# Patient Record
Sex: MALE | Race: WHITE | NOT HISPANIC OR LATINO | Employment: OTHER | URBAN - METROPOLITAN AREA
[De-identification: names, ages, dates, MRNs, and addresses within clinical notes are randomized per-mention and may not be internally consistent; named-entity substitution may affect disease eponyms.]

---

## 2017-05-18 ENCOUNTER — HISTORICAL (OUTPATIENT)
Dept: ADMINISTRATIVE | Facility: HOSPITAL | Age: 57
End: 2017-05-18

## 2017-05-18 LAB
ALBUMIN SERPL-MCNC: 4.3 GM/DL (ref 3.4–5)
ALBUMIN/GLOB SERPL: 2 RATIO (ref 1–2)
ALP SERPL-CCNC: 85 UNIT/L (ref 20–120)
ALT SERPL-CCNC: 49 UNIT/L
AST SERPL-CCNC: 32 UNIT/L
BILIRUB SERPL-MCNC: 1.1 MG/DL
BILIRUBIN DIRECT+TOT PNL SERPL-MCNC: <0.1 MG/DL
BILIRUBIN DIRECT+TOT PNL SERPL-MCNC: >1 MG/DL
BUN SERPL-MCNC: 20 MG/DL (ref 7–25)
CALCIUM SERPL-MCNC: 9.2 MG/DL (ref 8.4–10.3)
CHLORIDE SERPL-SCNC: 105 MMOL/L (ref 96–110)
CO2 SERPL-SCNC: 30 MMOL/L (ref 24–32)
CREAT SERPL-MCNC: 0.77 MG/DL (ref 0.7–1.4)
ERYTHROCYTE [DISTWIDTH] IN BLOOD BY AUTOMATED COUNT: 12.4 % (ref 11.5–14.5)
GLOBULIN SER-MCNC: 2.5 GM/ML (ref 2.3–3.5)
GLUCOSE SERPL-MCNC: 93 MG/DL (ref 65–99)
HCT VFR BLD AUTO: 43.3 % (ref 40–51)
HGB BLD-MCNC: 14.4 GM/DL (ref 13.5–17.5)
MCH RBC QN AUTO: 30.6 PG (ref 26–34)
MCHC RBC AUTO-ENTMCNC: 33.3 GM/DL (ref 31–37)
MCV RBC AUTO: 91.9 FL (ref 80–100)
PLATELET # BLD AUTO: 235 X10(3)/MCL (ref 130–400)
PMV BLD AUTO: 10.6 FL (ref 7.4–10.4)
POTASSIUM SERPL-SCNC: 3.9 MMOL/L (ref 3.6–5.2)
PROT SERPL-MCNC: 6.8 GM/DL (ref 6–8)
RBC # BLD AUTO: 4.71 X10(6)/MCL (ref 4.5–5.9)
SODIUM SERPL-SCNC: 142 MMOL/L (ref 135–146)
WBC # SPEC AUTO: 4.1 X10(3)/MCL (ref 4.5–11)

## 2017-05-25 ENCOUNTER — HISTORICAL (OUTPATIENT)
Dept: SURGERY | Facility: HOSPITAL | Age: 57
End: 2017-05-25

## 2017-06-20 ENCOUNTER — HISTORICAL (OUTPATIENT)
Dept: ADMINISTRATIVE | Facility: HOSPITAL | Age: 57
End: 2017-06-20

## 2017-09-19 ENCOUNTER — HISTORICAL (OUTPATIENT)
Dept: ADMINISTRATIVE | Facility: HOSPITAL | Age: 57
End: 2017-09-19

## 2017-09-21 LAB — FINAL CULTURE: NO GROWTH

## 2021-05-17 ENCOUNTER — HISTORICAL (OUTPATIENT)
Dept: RADIOLOGY | Facility: HOSPITAL | Age: 61
End: 2021-05-17

## 2021-05-19 LAB
ABS NEUT (OLG): 2.8 X10(3)/MCL (ref 1.5–6.9)
APTT PPP: 27.1 SEC (ref 23.4–34.9)
BUN SERPL-MCNC: 19 MG/DL (ref 8.4–25.7)
CALCIUM SERPL-MCNC: 9.8 MG/DL (ref 8.8–10)
CHLORIDE SERPL-SCNC: 105 MMOL/L (ref 98–107)
CO2 SERPL-SCNC: 31 MMOL/L (ref 23–31)
CREAT SERPL-MCNC: 0.86 MG/DL (ref 0.73–1.18)
CREAT/UREA NIT SERPL: 22
ERYTHROCYTE [DISTWIDTH] IN BLOOD BY AUTOMATED COUNT: 12.4 % (ref 11.5–17)
GLUCOSE SERPL-MCNC: 97 MG/DL (ref 82–115)
HCT VFR BLD AUTO: 44.1 % (ref 42–52)
HGB BLD-MCNC: 14.4 GM/DL (ref 14–18)
INR PPP: 1 (ref 2–3)
MCH RBC QN AUTO: 30 PG (ref 27–34)
MCHC RBC AUTO-ENTMCNC: 33 GM/DL (ref 31–36)
MCV RBC AUTO: 93 FL (ref 80–99)
PLATELET # BLD AUTO: 251 X10(3)/MCL (ref 140–400)
PMV BLD AUTO: 10.3 FL (ref 6.8–10)
POTASSIUM SERPL-SCNC: 4.4 MMOL/L (ref 3.5–5.1)
PROTHROMBIN TIME: 13.5 SEC (ref 11.7–14.5)
RBC # BLD AUTO: 4.74 X10(6)/MCL (ref 4.7–6.1)
SODIUM SERPL-SCNC: 142 MMOL/L (ref 136–145)
WBC # SPEC AUTO: 4.4 X10(3)/MCL (ref 4.5–11.5)

## 2021-05-20 ENCOUNTER — HISTORICAL (OUTPATIENT)
Dept: ANESTHESIOLOGY | Facility: HOSPITAL | Age: 61
End: 2021-05-20

## 2021-08-16 ENCOUNTER — HISTORICAL (OUTPATIENT)
Dept: LAB | Facility: HOSPITAL | Age: 61
End: 2021-08-16

## 2021-08-16 LAB — PSA SERPL-MCNC: 1.7 NG/ML

## 2022-04-10 ENCOUNTER — HISTORICAL (OUTPATIENT)
Dept: ADMINISTRATIVE | Facility: HOSPITAL | Age: 62
End: 2022-04-10

## 2022-04-29 VITALS
BODY MASS INDEX: 26.55 KG/M2 | OXYGEN SATURATION: 97 % | SYSTOLIC BLOOD PRESSURE: 108 MMHG | HEIGHT: 65 IN | WEIGHT: 167.31 LBS | DIASTOLIC BLOOD PRESSURE: 70 MMHG | HEIGHT: 67 IN | HEIGHT: 67 IN | DIASTOLIC BLOOD PRESSURE: 75 MMHG | SYSTOLIC BLOOD PRESSURE: 111 MMHG | SYSTOLIC BLOOD PRESSURE: 116 MMHG | OXYGEN SATURATION: 97 % | WEIGHT: 169.19 LBS | WEIGHT: 168.13 LBS | BODY MASS INDEX: 27.88 KG/M2 | BODY MASS INDEX: 26.39 KG/M2 | OXYGEN SATURATION: 97 % | DIASTOLIC BLOOD PRESSURE: 72 MMHG
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
## 2022-04-30 NOTE — OP NOTE
PREOPERATIVE DIAGNOSIS:  Left ureteral calculus.    POSTOPERATIVE DIAGNOSIS:  Left ureteral calculus.    ANESTHESIA:  General.    PROCEDURE:  Left ureteroscopy.    DESCRIPTION OF PROCEDURE:  After operative consent, patient was brought to the operating room and placed on the table in supine position.  General anesthesia induced.  Patient converted to dorsal lithotomy position.  Genital area prepped and draped in usual sterile fashion.  The previously placed stent was removed with the aid of its suture.  The scope was attempted to be inserted in the bladder, however, he had a fossa navicularis stricture and was unable to pass the scope.  This area of the urethra was dilated to a 4-Greek using the Giselle sounds.  The scope was then introduced.  The remainder of the urethra was within normal limits.  The prostate was 3 fingerbreadths with trilobar obstruction.  Bladder was smooth.  He had some bleeding from removal of the stent with ureteral orifices widely patent.  No other lesions identified.  At this time a left retrograde pyelogram was performed which revealed some hold-up of contrast in distal ureter.  At this time, a guidewire was guided up into the renal pelvis.  A rigid ureteroscope was advanced per urethra in the bladder and up the ureter and was advanced all the way into the renal pelvis.  No stones were evident.  There was no evidence of any injury to the ureter.  He did have an area of inflammation at the site of the stone impaction previously.  The ureteroscope was then removed.  Cystoscope was inserted.  We looked around the bladder trying to find a stone, however, visualization was compromised secondary to the bleeding.  We did not see a stone.  Retrograde pyelogram then performed.  He still continued to have this hold-up.  Ureteral scope was reinserted and advanced all the way into the renal pelvis.  Again, no stones were visualized and again no evidence of injury to the ureter.   Cystoscope reinserted.  Retrograde pyelogram performed.  No extravasation.  It was decided not to place a stent.  The bladder was then drained.  Cystoscope was removed.  The patient was returned to his room in good condition.  He will be     discharged on some Norco 5/325 1 p.o. q.6 hours p.r.n. pain and Cipro 500 mg for 3 days.  He will follow up in the office in 1 week with a KUB.        LM/AMERICO   DD: 05/20/2021 0736   DT: 05/20/2021 0831  Job # [id number]

## 2022-04-30 NOTE — DISCHARGE SUMMARY
Patient:   Ruben Thompson             MRN: [id number]            FIN: [id number]               Age:   56 years     Sex:  Male     :  1960   Associated Diagnoses:   None   Author:   Obey SHAW, Sima Castillo        DISCHARGE SUMMARY    Date Admitted: 2017    Date Discharged: Same    Chief Complaint: droopy eyelids causing visual obstruction OU    Significant findings:   Physical Examination: dermatochalasis   Laboratory: None   X-ray: None   Other Diagnostic Procedures: None    Principal Diagnosis: Dermatochalasis    Other Significant Diagnosis: None    Operations/Procedures: Bilateral upper lid blepharoplasty     Complications: None    Plan:   Follow-up: Next week at [facility] ophthalmology clinic   Medications: Resume home medications; use Tobradex ointment BID to incision sites    Diet: Resume home diet   Level of Activity: No strenous activity    Discharge to: Home    Condition at Discharge: Stable

## 2022-04-30 NOTE — H&P
Patient:   Ruben Thompson             MRN: [id number]            FIN: [id number]               Age:   56 years     Sex:  Male     :  1960   Associated Diagnoses:   None   Author:   Obey SHAW, Sima Castillo      Review of Systems   Constitutional:  Negative.    Eye:  Negative.    Ear/Nose/Mouth/Throat:  Negative.    Respiratory:  Negative.    Cardiovascular:  Negative.    Gastrointestinal:  Negative.    Genitourinary:  Negative.    Hematology/Lymphatics:  Negative.    Endocrine:  Negative.    Immunologic:  Negative.    Musculoskeletal:  Negative.    Integumentary:  Negative.    Neurologic:  Negative.    Psychiatric:  Negative.       Health Status   Allergies:    Allergic Reactions (All)  Severity Not Documented  No Known Allergies- No reactions were documented.,    Allergies (1) Active Reaction  No Known Allergies None Documented     Current medications:    Home Medications (6) Active  AMOX-CLAV 875MG TABLETS 1 tab(s), Oral, BID  Artificial Tears preserved ophthalmic solution 1 drop(s), PRN, Eye-Both, BID  Flonase 50 mcg/inh nasal spray 1 spray(s), Nasal, BID  Melatonin 5 mg oral capsule 5 mg = 1 cap(s), Oral, Once a day (at bedtime)  Norco 7.5 mg-325 mg oral tablet 1 tab(s), PRN, Oral, q4hr  Vitamin D , Oral     Problem list:    All Problems  Depression(  Confirmed  ) / SNOMED CT 65244632 / Confirmed  Deviated nasal septum(  Confirmed  ) / SNOMED CT 281675139 / Confirmed  Bilateral ankle fractures(  Confirmed  ) / SNOMED CT 26901645 / Confirmed  Facial fracture(  Confirmed  ) / SNOMED CT 73089826 / Confirmed  Bilateral fibular fractures / SNOMED CT 921072436 / Confirmed  Bilateral tibial fractures / SNOMED CT 17694662 / Confirmed  Nasal bone fracture(  Confirmed  ) / SNOMED CT 591073064 / Confirmed  Insomnia / SNOMED CT 349728256 / Confirmed  Palpitation(  Confirmed  ) / SNOMED CT 751784818 / Confirmed  Colon cancer screening / SNOMED CT 218005136 / Confirmed  Sleep apnea(  Confirmed  )  / SNOMED CT 765745973 / Confirmed  Vitamin D deficiency(  Confirmed  ) / SNOMED CT 60510136 / Confirmed,    Active Problems (12)  Bilateral ankle fractures(  Confirmed  )   Bilateral fibular fractures   Bilateral tibial fractures   Colon cancer screening   Depression(  Confirmed  )   Deviated nasal septum(  Confirmed  )   Facial fracture(  Confirmed  )   Insomnia   Nasal bone fracture(  Confirmed  )   Palpitation(  Confirmed  )   Sleep apnea(  Confirmed  )   Vitamin D deficiency(  Confirmed  )         Histories   Procedure history:    Rhinoplasty (None) on 10/21/2016 at 55 Years.  Comments:  10/21/2016 12:57 - Domenica Quintero RN  auto-populated from documented surgical case  Septoplasty on 10/21/2016 at 55 Years.  Comments:  10/21/2016 12:Shena - Domenica Quintero RN  auto-populated from documented surgical case  Turbinoplasty on 10/21/2016 at 55 Years.  Comments:  10/21/2016 12:Shena - Domenica Quintero RN  auto-populated from documented surgical case  Leg ([id number]).  Comments:  2/12/2015 16:37 - Contributor_system, [id number]  09/02/2014 16:19:04 - Green Qasimle: both legs  Left eye ([id number]).  Nose (457658675).  Injury, mouth ([id number]).  Kidney stones ([id number]).   Social History        Social & Psychosocial Habits    Alcohol  07/31/2015  Use: Never    Employment/School  07/31/2015  Status: DISABLE    Substance Abuse  07/31/2015  Use: Never    Tobacco  07/31/2015  Use: Never smoker    10/27/2016  Use: Never smoker  .        Physical Examination   General:  Alert and oriented.    Eye:  Pupils are equal, round and reactive to light.    Neck:  Supple.    Respiratory:  Lungs are clear to auscultation.    Cardiovascular:  Normal rate, Regular rhythm.    Gastrointestinal:  Soft, Non-tender.    Neurologic:  Alert, Oriented.       Impression and Plan   Counseled:  Patient.

## 2022-04-30 NOTE — PROGRESS NOTES
Patient:   Ruben Thompson             MRN: [id number]            FIN: [id number]               Age:   56 years     Sex:  Male     :  1960   Associated Diagnoses:   Penile pain; Hematospermia   Author:   Tremayne Weiss MD      Chief Complaint   2017 10:25 CDT      patient is c/o blood in his semen with slight pain, x5 days  (Modified)       History of Present Illness   56yoM who complains of blood visualized in his semen. The patient states a week prior to presentation during intercourse his ejaculation became painful and he noticed blood in his semen. Since that time the pain has subsided however he has still noticed blood in his semen on at least 2 occasions. He denies a hx of hematuria or dysuria. The patient denies priapism or hx of genitourinary structural abnormalities. He denies hx of STDs or steroid  use.      Review of Systems   Constitutional:  No fever.    Respiratory:  No shortness of breath, No cough.    Cardiovascular:  No chest pain.    Genitourinary:  No dysuria, No hematuria, No change in urine stream, No lesions.    All other systems are negative      Physical Examination   Vital Signs   2017 10:25 CDT      Temperature Oral          37.0 DegC                             Peripheral Pulse Rate     78 bpm                             SpO2                      97 %                             Systolic Blood Pressure   116 mmHg                             Diastolic Blood Pressure  75 mmHg     General:  Alert and oriented, No acute distress.    Eye:  Extraocular movements are intact.    Respiratory:  Lungs are clear to auscultation, Respirations are non-labored, Breath sounds are equal, Symmetrical chest wall expansion.    Cardiovascular:  Normal rate, Regular rhythm, No murmur.    Genitourinary:  No scrotal tenderness, No inguinal tenderness, No urethral discharge, No lesions, Normal cremasteric reflexes.         Groin/ inguinal region: femoral pulses intact bilaterally, No  inguinal hernia, No femoral hernia, No tenderness.         Penis: Foreskin ( Circumcised ), Shaft ( Not indurated, Not with priapism, Not tender ), No discharge.         Scrotum: mild transillumination on R scrotum.         Testes: Bilateral, mildly asymmetric, No mass.    Cognition and Speech:  Speech clear and coherent.    Psychiatric:  Cooperative, Appropriate mood & affect.       Impression and Plan   Diagnosis     Hematospermia (KKP20-TW R36.1).     Penile pain (XRM99-VW N48.89).     - GC/CT ordered  - HIV 1 and 2 ordered  - PSA ordered   - UA and Cx ordered  - Transrectal US Ordered  - Likely transient     RTC  with PCP Dr. Polanco in 2 weeks

## 2022-04-30 NOTE — OP NOTE
Patient:   Ruben Thompson             MRN: [id number]            FIN: [id number]               Age:   56 years     Sex:  Male     :  1960   Associated Diagnoses:   None   Author:   Sima Barnhart MD      PROCEDURE: BILATERAL UPPER LID BLEPHAROPLASTY  SURGEON:  JUDITH CARTAGENA III  ASSISTANT: ANATOLIY BARNHART  PRE OPERATIVE DIAGNOSIS: DERMATOCHALASIS  POST OPERATIVE DIAGNOSIS: DERMATOCHALASIS  DATE OF SURGERY: 2017  PROCEDURES: BILATERAL UPPER LID BLEPHAROPLASTY  ANESTHESIA: MAC with SQ 2% Lidocaine with epi  COMPLICATIONS: None   ESTIMATED BLOOD LOSS: 2mL  INDICATIONS: dermatochalasis  The patient has a history of painless progressive visual loss and difficulty with activities of daily living secondary to dermatochlasis. After a thorough discussion of the risks, benefits and alternatives to surgery, including, but not limited to, the rare risks of infection, need for additional surgery, loss of vision and even loss of the eye, the patient voices good understanding and desires to proceed.   DESCRIPTION OF PROCEDURE:   After verification and marking of eyelids in the preop holding area, the patient was brought to the operating room in supine position where the eyes were prepped and draped in standard sterile fashion using 5% betadine. SQ 2% Lidocaine with epi was injected into both upper lids. A 15-blade was used to cut the skin along the prviously made markings in an ellipsoid pattern. Wescots were then used to remove the skin and superficial tissues. The septum was then dissected and parts of the medial and middle orbital fat pats were removed. Hemostasis was achieved with bipolar cautery. The incison was then closed with 2 running 6-0 Nylon sutures. The same procedure was then performed on the other eye. The patient tolerated the procedure well without complicatons. He will use Tobradex ointment to the incisions BID and see us in clinic next week for suture removal.

## 2024-06-20 DIAGNOSIS — R10.9 UNSPECIFIED ABDOMINAL PAIN: ICD-10-CM

## 2024-06-20 DIAGNOSIS — R31.21 ASYMPTOMATIC MICROSCOPIC HEMATURIA: Primary | ICD-10-CM

## 2024-06-21 ENCOUNTER — HOSPITAL ENCOUNTER (OUTPATIENT)
Dept: RADIOLOGY | Facility: HOSPITAL | Age: 64
Discharge: HOME | End: 2024-06-21
Payer: MEDICARE

## 2024-06-21 DIAGNOSIS — R31.21 ASYMPTOMATIC MICROSCOPIC HEMATURIA: ICD-10-CM

## 2024-06-21 DIAGNOSIS — R10.9 UNSPECIFIED ABDOMINAL PAIN: ICD-10-CM

## 2024-06-21 LAB
CREAT SERPL-MCNC: 1 MG/DL (ref 0.5–1.4)
SAMPLE: NORMAL

## 2024-06-21 PROCEDURE — 74178 CT ABD&PLV WO CNTR FLWD CNTR: CPT | Mod: TC

## 2024-06-21 PROCEDURE — 25500020 PHARM REV CODE 255

## 2024-06-21 RX ADMIN — IOPAMIDOL 100 ML: 755 INJECTION, SOLUTION INTRAVENOUS at 08:06

## 2025-03-11 ENCOUNTER — HOSPITAL ENCOUNTER (EMERGENCY)
Facility: HOSPITAL | Age: 65
Discharge: HOME | End: 2025-03-11
Attending: INTERNAL MEDICINE
Payer: MEDICARE

## 2025-03-11 VITALS
TEMPERATURE: 96 F | DIASTOLIC BLOOD PRESSURE: 83 MMHG | SYSTOLIC BLOOD PRESSURE: 119 MMHG | BODY MASS INDEX: 26.08 KG/M2 | WEIGHT: 152.75 LBS | OXYGEN SATURATION: 97 % | RESPIRATION RATE: 15 BRPM | HEIGHT: 64 IN | HEART RATE: 63 BPM

## 2025-03-11 DIAGNOSIS — R73.9 HYPERGLYCEMIA: ICD-10-CM

## 2025-03-11 DIAGNOSIS — F41.0 PANIC ATTACK: Primary | ICD-10-CM

## 2025-03-11 LAB
ALBUMIN SERPL-MCNC: 4.2 G/DL (ref 3.4–4.8)
ALBUMIN/GLOB SERPL: 1.5 RATIO (ref 1.1–2)
ALP SERPL-CCNC: 93 UNIT/L (ref 40–150)
ALT SERPL-CCNC: 36 UNIT/L (ref 0–55)
ANION GAP SERPL CALC-SCNC: 10 MEQ/L
AST SERPL-CCNC: 36 UNIT/L (ref 5–34)
BACTERIA #/AREA URNS AUTO: ABNORMAL /HPF
BASOPHILS # BLD AUTO: 0.05 X10(3)/MCL
BASOPHILS NFR BLD AUTO: 1 %
BILIRUB SERPL-MCNC: 1 MG/DL
BILIRUB UR QL STRIP.AUTO: NEGATIVE
BNP BLD-MCNC: 17.1 PG/ML
BUN SERPL-MCNC: 16 MG/DL (ref 8.4–25.7)
CALCIUM SERPL-MCNC: 9.2 MG/DL (ref 8.8–10)
CAOX CRY UR QL COMP ASSIST: ABNORMAL
CHLORIDE SERPL-SCNC: 109 MMOL/L (ref 98–107)
CLARITY UR: CLEAR
CO2 SERPL-SCNC: 23 MMOL/L (ref 23–31)
COLOR UR AUTO: YELLOW
CREAT SERPL-MCNC: 0.79 MG/DL (ref 0.72–1.25)
CREAT/UREA NIT SERPL: 20
EOSINOPHIL # BLD AUTO: 0.16 X10(3)/MCL (ref 0–0.9)
EOSINOPHIL NFR BLD AUTO: 3.3 %
ERYTHROCYTE [DISTWIDTH] IN BLOOD BY AUTOMATED COUNT: 12.5 % (ref 11.5–17)
ETHANOL SERPL-MCNC: <10 MG/DL
GFR SERPLBLD CREATININE-BSD FMLA CKD-EPI: >60 ML/MIN/1.73/M2
GLOBULIN SER-MCNC: 2.8 GM/DL (ref 2.4–3.5)
GLUCOSE SERPL-MCNC: 137 MG/DL (ref 82–115)
GLUCOSE UR QL STRIP: NEGATIVE
HCT VFR BLD AUTO: 43.5 % (ref 42–52)
HGB BLD-MCNC: 14.6 G/DL (ref 14–18)
HGB UR QL STRIP: ABNORMAL
IMM GRANULOCYTES # BLD AUTO: 0.01 X10(3)/MCL (ref 0–0.04)
IMM GRANULOCYTES NFR BLD AUTO: 0.2 %
KETONES UR QL STRIP: NEGATIVE
LACTATE SERPL-SCNC: 1.2 MMOL/L (ref 0.5–2.2)
LEUKOCYTE ESTERASE UR QL STRIP: ABNORMAL
LIPASE SERPL-CCNC: 28 U/L
LYMPHOCYTES # BLD AUTO: 1.55 X10(3)/MCL (ref 0.6–4.6)
LYMPHOCYTES NFR BLD AUTO: 31.8 %
MCH RBC QN AUTO: 30.5 PG (ref 27–31)
MCHC RBC AUTO-ENTMCNC: 33.6 G/DL (ref 33–36)
MCV RBC AUTO: 91 FL (ref 80–94)
MONOCYTES # BLD AUTO: 0.67 X10(3)/MCL (ref 0.1–1.3)
MONOCYTES NFR BLD AUTO: 13.8 %
NEUTROPHILS # BLD AUTO: 2.43 X10(3)/MCL (ref 2.1–9.2)
NEUTROPHILS NFR BLD AUTO: 49.9 %
NITRITE UR QL STRIP: NEGATIVE
NRBC BLD AUTO-RTO: 0 %
PH UR STRIP: 5.5 [PH]
PLATELET # BLD AUTO: 221 X10(3)/MCL (ref 130–400)
PMV BLD AUTO: 10.4 FL (ref 7.4–10.4)
POCT GLUCOSE: 143 MG/DL (ref 70–110)
POTASSIUM SERPL-SCNC: 3.6 MMOL/L (ref 3.5–5.1)
PROT SERPL-MCNC: 7 GM/DL (ref 5.8–7.6)
PROT UR QL STRIP: NEGATIVE
RBC # BLD AUTO: 4.78 X10(6)/MCL (ref 4.7–6.1)
RBC #/AREA URNS AUTO: ABNORMAL /HPF
SODIUM SERPL-SCNC: 142 MMOL/L (ref 136–145)
SP GR UR STRIP.AUTO: 1.01 (ref 1–1.03)
SQUAMOUS #/AREA URNS AUTO: ABNORMAL /HPF
TROPONIN I SERPL-MCNC: <0.01 NG/ML (ref 0–0.04)
TSH SERPL-ACNC: 2.38 UIU/ML (ref 0.35–4.94)
UROBILINOGEN UR STRIP-ACNC: 0.2
WBC # BLD AUTO: 4.87 X10(3)/MCL (ref 4.5–11.5)
WBC #/AREA URNS AUTO: ABNORMAL /HPF

## 2025-03-11 PROCEDURE — 99284 EMERGENCY DEPT VISIT MOD MDM: CPT | Mod: 25

## 2025-03-11 PROCEDURE — 80053 COMPREHEN METABOLIC PANEL: CPT | Performed by: INTERNAL MEDICINE

## 2025-03-11 PROCEDURE — 85025 COMPLETE CBC W/AUTO DIFF WBC: CPT | Performed by: INTERNAL MEDICINE

## 2025-03-11 PROCEDURE — 84443 ASSAY THYROID STIM HORMONE: CPT | Performed by: INTERNAL MEDICINE

## 2025-03-11 PROCEDURE — 83605 ASSAY OF LACTIC ACID: CPT | Performed by: INTERNAL MEDICINE

## 2025-03-11 PROCEDURE — 82962 GLUCOSE BLOOD TEST: CPT

## 2025-03-11 PROCEDURE — 81001 URINALYSIS AUTO W/SCOPE: CPT | Performed by: INTERNAL MEDICINE

## 2025-03-11 PROCEDURE — 83880 ASSAY OF NATRIURETIC PEPTIDE: CPT | Performed by: INTERNAL MEDICINE

## 2025-03-11 PROCEDURE — 83690 ASSAY OF LIPASE: CPT | Performed by: INTERNAL MEDICINE

## 2025-03-11 PROCEDURE — 84484 ASSAY OF TROPONIN QUANT: CPT | Performed by: INTERNAL MEDICINE

## 2025-03-11 PROCEDURE — 82077 ASSAY SPEC XCP UR&BREATH IA: CPT | Performed by: INTERNAL MEDICINE

## 2025-03-11 NOTE — ED PROVIDER NOTES
Encounter Date: 3/11/2025       History     Chief Complaint   Patient presents with    Altered Mental Status     Pt states he became tearful/ crying upon waking at 04:00 this morning. Wife states last time pt had a similar episode it was due to hypoglycemia.  in triage.  GCS 15. PERRLA. Pt is tearful in triage.       64-year-old white male presents emergency department stating for the past couple of mornings he has been waking up tearful but this morning he noted extreme episode with questionable confusion and wife stated the last time this happened he was hypoglycemic so they came to the ER for evaluation      Review of patient's allergies indicates:  No Known Allergies  History reviewed. No pertinent past medical history.  History reviewed. No pertinent surgical history.  No family history on file.  Social History[1]  Review of Systems   Constitutional: Negative.  Negative for activity change, appetite change, chills, diaphoresis, fatigue, fever and unexpected weight change.   HENT: Negative.  Negative for congestion, dental problem, drooling, ear discharge, ear pain, facial swelling, hearing loss, mouth sores, nosebleeds, postnasal drip, rhinorrhea, sinus pressure, sinus pain, sneezing, sore throat, tinnitus, trouble swallowing and voice change.    Eyes: Negative.  Negative for photophobia, pain, discharge, redness, itching and visual disturbance.   Respiratory: Negative.  Negative for apnea, cough, choking, chest tightness, shortness of breath, wheezing and stridor.    Cardiovascular: Negative.  Negative for chest pain, palpitations and leg swelling.   Gastrointestinal: Negative.  Negative for abdominal distention, abdominal pain, anal bleeding, blood in stool, constipation, diarrhea, nausea, rectal pain and vomiting.   Endocrine: Negative.  Negative for cold intolerance, heat intolerance, polydipsia, polyphagia and polyuria.   Genitourinary: Negative.  Negative for decreased urine volume, difficulty  urinating, dysuria, enuresis, flank pain, frequency, genital sores, hematuria, penile discharge, penile pain, penile swelling, scrotal swelling, testicular pain and urgency.   Musculoskeletal: Negative.  Negative for arthralgias, back pain, gait problem, joint swelling, myalgias, neck pain and neck stiffness.   Skin: Negative.  Negative for color change, pallor, rash and wound.   Allergic/Immunologic: Negative.  Negative for environmental allergies, food allergies and immunocompromised state.   Neurological: Negative.  Negative for dizziness, tremors, seizures, syncope, facial asymmetry, speech difficulty, weakness, light-headedness, numbness and headaches.   Hematological: Negative.  Negative for adenopathy. Does not bruise/bleed easily.   Psychiatric/Behavioral:  Positive for confusion. Negative for agitation, behavioral problems, decreased concentration, dysphoric mood, hallucinations, self-injury, sleep disturbance and suicidal ideas. The patient is nervous/anxious. The patient is not hyperactive.    All other systems reviewed and are negative.      Physical Exam     Initial Vitals [03/11/25 0455]   BP Pulse Resp Temp SpO2   (!) 152/83 64 18 96 °F (35.6 °C) 100 %      MAP       --         Physical Exam    Nursing note and vitals reviewed.  Constitutional: He appears well-developed and well-nourished.   HENT:   Head: Normocephalic and atraumatic.   Eyes: Conjunctivae and EOM are normal. Pupils are equal, round, and reactive to light.   Neck: Neck supple.   Normal range of motion.  Cardiovascular:  Normal rate and regular rhythm.           Pulmonary/Chest: Breath sounds normal.   Abdominal: Abdomen is soft. Bowel sounds are normal.   Musculoskeletal:         General: Normal range of motion.      Cervical back: Normal range of motion and neck supple.     Neurological: He is alert and oriented to person, place, and time. He has normal strength. No cranial nerve deficit or sensory deficit. GCS eye subscore is 4. GCS  verbal subscore is 5. GCS motor subscore is 6.   Skin: Skin is warm and dry. Capillary refill takes less than 2 seconds.   Psychiatric: He has a normal mood and affect. His behavior is normal. Judgment and thought content normal.         ED Course   Procedures  Labs Reviewed   COMPREHENSIVE METABOLIC PANEL - Abnormal       Result Value    Sodium 142      Potassium 3.6      Chloride 109 (*)     CO2 23      Glucose 137 (*)     Blood Urea Nitrogen 16.0      Creatinine 0.79      Calcium 9.2      Protein Total 7.0      Albumin 4.2      Globulin 2.8      Albumin/Globulin Ratio 1.5      Bilirubin Total 1.0      ALP 93      ALT 36      AST 36 (*)     eGFR >60      Anion Gap 10.0      BUN/Creatinine Ratio 20     URINALYSIS, REFLEX TO URINE CULTURE - Abnormal    Color, UA Yellow      Appearance, UA Clear      Specific Gravity, UA 1.015      pH, UA 5.5      Protein, UA Negative      Glucose, UA Negative      Ketones, UA Negative      Blood, UA Trace-Intact (*)     Bilirubin, UA Negative      Urobilinogen, UA 0.2      Nitrites, UA Negative      Leukocyte Esterase, UA Trace (*)    URINALYSIS, MICROSCOPIC - Abnormal    Bacteria, UA None Seen      Calcium Oxalate Crystals, UA Occasional (*)     RBC, UA 0-2      WBC, UA 0-2      Squamous Epithelial Cells, UA None Seen     POCT GLUCOSE - Abnormal    POCT Glucose 143 (*)    TSH - Normal    TSH 2.385     TROPONIN I - Normal    Troponin-I <0.010     B-TYPE NATRIURETIC PEPTIDE - Normal    Natriuretic Peptide 17.1     LIPASE - Normal    Lipase Level 28     LACTIC ACID, PLASMA - Normal    Lactic Acid Level 1.2     ALCOHOL,MEDICAL (ETHANOL) - Normal    Ethanol Level <10.0     CBC W/ AUTO DIFFERENTIAL    Narrative:     The following orders were created for panel order CBC auto differential.  Procedure                               Abnormality         Status                     ---------                               -----------         ------                     CBC with  Differential[[id number]]                           Final result                 Please view results for these tests on the individual orders.   CBC WITH DIFFERENTIAL    WBC 4.87      RBC 4.78      Hgb 14.6      Hct 43.5      MCV 91.0      MCH 30.5      MCHC 33.6      RDW 12.5      Platelet 221      MPV 10.4      Neut % 49.9      Lymph % 31.8      Mono % 13.8      Eos % 3.3      Basophil % 1.0      Imm Grans % 0.2      Neut # 2.43      Lymph # 1.55      Mono # 0.67      Eos # 0.16      Baso # 0.05      Imm Gran # 0.01      NRBC% 0.0            Imaging Results              CT Head Without Contrast (Preliminary result)  Result time 03/11/25 05:42:38      Preliminary result by Sung Tristan MD (03/11/25 05:42:38)                   Narrative:    START OF REPORT:  Technique: CT of the head was performed without intravenous contrast with axial as well as coronal and sagittal images.    Comparison: None.    Dosage Information: Automated Exposure Control was utilized 871.81 mGy.cm.    Clinical history: Altered mental status.    Findings:  Hemorrhage: No acute intracranial hemorrhage is seen.  CSF spaces: The ventricles sulci and basal cisterns are within normal limits for age.  Brain parenchyma: There is preservation of the grey white junction throughout. No acute infarct is identified.  Cerebellum: Unremarkable.  Sella and skull base: The sella appears to be within normal limits for age.  Intracranial calcifications: Incidental note is made of bilateral choroid plexus calcification. Incidental note is made of some pineal region calcification.  Calvarium: No acute linear or depressed skull fracture is seen.    Maxillofacial Structures:  Paranasal sinuses: The visualized paranasal sinuses appear clear with no significant mucoperiosteal thickening or air fluid levels identified. An orthopedic device is observed in the wall of the left maxillary sinus.  Orbits: The orbits appear unremarkable.  Zygomatic arches: The zygomatic  arches are intact and unremarkable.  Temporal bones and mastoids: The temporal bones and mastoids appear unremarkable.  TMJ: The mandibular condyles appear normally placed with respect to the mandibular fossa.      Impression:  1. No acute intracranial process identified. Details and other findings as noted above.                                         Medications - No data to display  Medical Decision Making  64-year-old white male presents emergency department complaining of both change in mental status with tearfulness upon waking.  Differential diagnosis includes but is not limited to cerebrovascular accident, intracranial bleed, urinary tract infection, sepsis, viral encephalopathy, depression, dementia, electrolyte abnormality, toxic encephalopathy.  Workup included a CT of the head and blood work and a urinalysis.  His workup is relatively benign other than mild hyperglycemia in his further discussion with him it was found that his child  7 years ago and this past weekend was the anniversary of the death child's birthday.  We had a long discussion about depression and panic and patient states he does not see a primary for routine care just goes to the clinic and iota so recommend he get a primary have annual blood work get put on maintenance visits and discussed possible treatment for depression because I feel he had a panic attack from this    Problems Addressed:  Hyperglycemia: chronic illness or injury  Panic attack: acute illness or injury with systemic symptoms    Amount and/or Complexity of Data Reviewed  Independent Historian: spouse  Labs: ordered. Decision-making details documented in ED Course.  Radiology: ordered. Decision-making details documented in ED Course.    Risk  OTC drugs.                                      Clinical Impression:  Final diagnoses:  [F41.0] Panic attack (Primary)  [R73.9] Hyperglycemia          ED Disposition Condition    Discharge Stable          ED Prescriptions   "  None       Follow-up Information       Follow up With Specialties Details Why Contact Info    Primary care physician  In 2 days            Portions of this note have been created with voice recognition software. Occasional "wrong-words" or "sound alike" substitutions may have occurred due to inherent limitations of voice software. Please read the note carefully and recognize, using context, word substitutions may have occurred.         [1]   Social History  Tobacco Use    Smoking status: Never    Smokeless tobacco: Never        Sal Banks MD  03/11/25 0601    "